# Patient Record
Sex: FEMALE | Race: BLACK OR AFRICAN AMERICAN | Employment: PART TIME | URBAN - METROPOLITAN AREA
[De-identification: names, ages, dates, MRNs, and addresses within clinical notes are randomized per-mention and may not be internally consistent; named-entity substitution may affect disease eponyms.]

---

## 2018-02-20 ENCOUNTER — OFFICE VISIT (OUTPATIENT)
Dept: OBGYN CLINIC | Age: 33
End: 2018-02-20

## 2018-02-20 VITALS — RESPIRATION RATE: 19 BRPM | SYSTOLIC BLOOD PRESSURE: 153 MMHG | HEART RATE: 89 BPM | DIASTOLIC BLOOD PRESSURE: 86 MMHG

## 2018-02-20 DIAGNOSIS — Z01.419 WELL FEMALE EXAM WITH ROUTINE GYNECOLOGICAL EXAM: Primary | ICD-10-CM

## 2018-02-20 NOTE — PROGRESS NOTES
Oliva Godwin is a ,  35 y.o. female [address] whose No LMP recorded. Patient is not currently having periods (Reason: IUD). was on  who presents for her annual checkup. She is having no significant problems. With regard to the Gardisil vaccine, she is older than the FDA approved age to receive it. Menstrual status:    Her periods are minimal to nonexistent in flow. She denies dysmenorrhea. She reports no premenstrual symptoms. Contraception:    The current method of family planning is IUD. Sexual history:    She  reports that she currently engages in sexual activity and has had male partners. She reports using the following method of birth control/protection: IUD. Medical conditions:    Since her last annual GYN exam about one year ago, she has not the following changes in her health history: none. Pap and Mammogram History:    Her most recent Pap smear was negative and HPV negative obtained 2.5 year(s) ago. The patient has never had a mammogram.    The patient does not have a family history of breast cancer. Past Medical History:   Diagnosis Date    Hx of abnormal cervical Pap smear     DENISE I    Hx of chlamydia infection 2009     History reviewed. No pertinent surgical history. Current Outpatient Prescriptions   Medication Sig Dispense Refill    levonorgestrel (MIRENA) 20 mcg/24 hr (5 years) IUD 1 Device by IntraUTERine route once. Allergies: Excedrin [acetaminophen-caffeine] and Ibuprofen   Social History     Social History    Marital status: SINGLE     Spouse name: N/A    Number of children: N/A    Years of education: N/A     Occupational History    Not on file.      Social History Main Topics    Smoking status: Never Smoker    Smokeless tobacco: Never Used    Alcohol use No    Drug use: No    Sexual activity: Yes     Partners: Male     Birth control/ protection: IUD     Other Topics Concern    Not on file     Social History Narrative     Tobacco History:  reports that she has never smoked. She has never used smokeless tobacco.  Alcohol Abuse:  reports that she does not drink alcohol. Drug Abuse:  reports that she does not use illicit drugs. There is no problem list on file for this patient.       Review of Systems - History obtained from the patient  Constitutional: negative for weight loss, fever, night sweats  HEENT: negative for hearing loss, earache, congestion, snoring, sorethroat  CV: negative for chest pain, palpitations, edema  Resp: negative for cough, shortness of breath, wheezing  GI: negative for change in bowel habits, abdominal pain, black or bloody stools  : negative for frequency, dysuria, hematuria, vaginal discharge  MSK: negative for back pain, joint pain, muscle pain  Breast: negative for breast lumps, nipple discharge, galactorrhea  Skin :negative for itching, rash, hives  Neuro: negative for dizziness, headache, confusion, weakness  Psych: negative for anxiety, depression, change in mood  Heme/lymph: negative for bleeding, bruising, pallor    Physical Exam    Visit Vitals    /86 (BP 1 Location: Left arm, BP Patient Position: Sitting)    Pulse 89    Resp 19       Constitutional  · Appearance: well-nourished, well developed, alert, in no acute distress    HENT  · Head and Face: appears normal    Neck  · Inspection/Palpation: normal appearance, no masses or tenderness  · Lymph Nodes: no lymphadenopathy present    Chest  · Respiratory Effort: breathing normal    Breasts  · Inspection of Breasts: breasts symmetrical, no skin changes, no discharge present, nipple appearance normal, no skin retraction present  · Palpation of Breasts and Axillae: no masses present on palpation, no breast tenderness  · Axillary Lymph Nodes: no lymphadenopathy present    Gastrointestinal  · Abdominal Examination: abdomen non-tender to palpation, normal bowel sounds, no masses present  · Liver and spleen: no hepatomegaly present, spleen not palpable  · Hernias: no hernias identified    Genitourinary  · External Genitalia: normal appearance for age, no discharge present, no tenderness present, no inflammatory lesions present, no masses present, no atrophy present  · Vagina: normal vaginal vault without central or paravaginal defects, no discharge present, no inflammatory lesions present, no masses present  · Bladder: non-tender to palpation  · Urethra: appears normal  · Cervix: normal, IUD string seen   · Uterus: normal size, shape and consistency  · Adnexa: no adnexal tenderness present, no adnexal masses present  · Perineum: perineum within normal limits, no evidence of trauma, no rashes or skin lesions present  · Anus: anus within normal limits, no hemorrhoids present  · Inguinal Lymph Nodes: no lymphadenopathy present    Skin  · General Inspection: no rash, no lesions identified    Neurologic/Psychiatric  · Mental Status:  · Orientation: grossly oriented to person, place and time  · Mood and Affect: mood normal, affect appropriate    . Assessment:  Routine gynecologic examination  Her current medical status is satisfactory with no evidence of significant gynecologic issues.     Plan:  Counseled re: diet, exercise, healthy lifestyle  Return for yearly wellness visits

## 2019-03-14 ENCOUNTER — OFFICE VISIT (OUTPATIENT)
Dept: OBGYN CLINIC | Age: 34
End: 2019-03-14

## 2019-03-14 VITALS
DIASTOLIC BLOOD PRESSURE: 101 MMHG | HEIGHT: 65 IN | SYSTOLIC BLOOD PRESSURE: 137 MMHG | HEART RATE: 92 BPM | RESPIRATION RATE: 19 BRPM | WEIGHT: 207 LBS | BODY MASS INDEX: 34.49 KG/M2

## 2019-03-14 DIAGNOSIS — Z01.419 WELL FEMALE EXAM WITH ROUTINE GYNECOLOGICAL EXAM: Primary | ICD-10-CM

## 2019-03-14 DIAGNOSIS — Z11.51 SPECIAL SCREENING EXAMINATION FOR HUMAN PAPILLOMAVIRUS (HPV): ICD-10-CM

## 2019-03-14 NOTE — LETTER
NOTIFICATION RETURN TO WORK / SCHOOL 
 
3/14/2019 8:54 AM 
 
Ms. Gregor Hyman [place] [id number] To Whom It May Concern: 
 
Gregor Hyman is currently under the care of [address] [place] [address]. She will return to work/school on: 03/14/19 If there are questions or concerns please have the patient contact our office.  
 
 
 
Sincerely, 
 
 
Isis Segura MD

## 2019-03-14 NOTE — PROGRESS NOTES
Gideon Harmno is a ,  29 y.o. female [address] whose No LMP recorded. Patient is not currently having periods (Reason: IUD). was on  who presents for her annual checkup. She is having no significant problems. With regard to the Gardisil vaccine, she is older than the FDA approved age to receive it. Menstrual status:    Her periods are minimal to nonexistent in flow. She denies dysmenorrhea. She reports no premenstrual symptoms. Contraception:    The current method of family planning is IUD. Sexual history:    She  reports that she currently engages in sexual activity and has had partners who are Male. She reports using the following method of birth control/protection: IUD. Medical conditions:    Since her last annual GYN exam about one year ago, she has not the following changes in her health history: none. Pap and Mammogram History:    Her most recent Pap smear was negative and HPV negative obtained 3.5 year(s) ago. The patient has never had a mammogram.    The patient does not have a family history of breast cancer. Past Medical History:   Diagnosis Date    Hx of abnormal cervical Pap smear     DENISE I    Hx of chlamydia infection 2009     History reviewed. No pertinent surgical history. Current Outpatient Medications   Medication Sig Dispense Refill    levonorgestrel (MIRENA) 20 mcg/24 hr (5 years) IUD 1 Device by IntraUTERine route once.        Allergies: Excedrin [acetaminophen-caffeine] and Ibuprofen   Social History     Socioeconomic History    Marital status: SINGLE     Spouse name: Not on file    Number of children: Not on file    Years of education: Not on file    Highest education level: Not on file   Social Needs    Financial resource strain: Not on file    Food insecurity - worry: Not on file    Food insecurity - inability: Not on file    Transportation needs - medical: Not on file    Transportation needs - non-medical: Not on file   Occupational History    Not on file   Tobacco Use    Smoking status: Never Smoker    Smokeless tobacco: Never Used   Substance and Sexual Activity    Alcohol use: No     Alcohol/week: 0.0 oz    Drug use: No    Sexual activity: Yes     Partners: Male     Birth control/protection: IUD   Other Topics Concern    Not on file   Social History Narrative    Not on file     Tobacco History:  reports that  has never smoked. she has never used smokeless tobacco.  Alcohol Abuse:  reports that she does not drink alcohol. Drug Abuse:  reports that she does not use drugs. There is no problem list on file for this patient.       Review of Systems - History obtained from the patient  Constitutional: negative for weight loss, fever, night sweats  HEENT: negative for hearing loss, earache, congestion, snoring, sorethroat  CV: negative for chest pain, palpitations, edema  Resp: negative for cough, shortness of breath, wheezing  GI: negative for change in bowel habits, abdominal pain, black or bloody stools  : negative for frequency, dysuria, hematuria, vaginal discharge  MSK: negative for back pain, joint pain, muscle pain  Breast: negative for breast lumps, nipple discharge, galactorrhea  Skin :negative for itching, rash, hives  Neuro: negative for dizziness, headache, confusion, weakness  Psych: negative for anxiety, depression, change in mood  Heme/lymph: negative for bleeding, bruising, pallor    Physical Exam    Visit Vitals  BP (!) 137/101 (BP 1 Location: Left arm, BP Patient Position: Sitting)   Pulse 92   Resp 19   Ht 5' 5\" (1.651 m)   Wt 207 lb (93.9 kg)   BMI 34.45 kg/m²       Constitutional  · Appearance: well-nourished, well developed, alert, in no acute distress    HENT  · Head and Face: appears normal    Neck  · Inspection/Palpation: normal appearance, no masses or tenderness  · Lymph Nodes: no lymphadenopathy present    Chest  · Respiratory Effort: breathing normal    Breasts  · Inspection of Breasts: breasts symmetrical, no skin changes, no discharge present, nipple appearance normal, no skin retraction present  · Palpation of Breasts and Axillae: no masses present on palpation, no breast tenderness  · Axillary Lymph Nodes: no lymphadenopathy present    Gastrointestinal  · Abdominal Examination: abdomen non-tender to palpation, normal bowel sounds, no masses present  · Liver and spleen: no hepatomegaly present, spleen not palpable  · Hernias: no hernias identified    Genitourinary  · External Genitalia: normal appearance for age, no discharge present, no tenderness present, no inflammatory lesions present, no masses present, no atrophy present  · Vagina: normal vaginal vault without central or paravaginal defects, no discharge present, no inflammatory lesions present, no masses present  · Bladder: non-tender to palpation  · Urethra: appears normal  · Cervix: normal, short IUD string seen   · Uterus: normal size, shape and consistency  · Adnexa: no adnexal tenderness present, no adnexal masses present  · Perineum: perineum within normal limits, no evidence of trauma, no rashes or skin lesions present  · Anus: anus within normal limits, no hemorrhoids present  · Inguinal Lymph Nodes: no lymphadenopathy present    Skin  · General Inspection: no rash, no lesions identified    Neurologic/Psychiatric  · Mental Status:  · Orientation: grossly oriented to person, place and time  · Mood and Affect: mood normal, affect appropriate    . Assessment:  Routine gynecologic examination  Her current medical status is satisfactory with no evidence of significant gynecologic issues. Plan:  Counseled re: diet, exercise, healthy lifestyle  Return for yearly wellness visits  Advised to see PCP re BP. Advised IUD expires in one year.

## 2019-03-16 LAB
CYTOLOGIST CVX/VAG CYTO: NORMAL
CYTOLOGY CVX/VAG DOC THIN PREP: NORMAL
CYTOLOGY HISTORY:: NORMAL
DX ICD CODE: NORMAL
HPV I/H RISK 1 DNA CVX QL PROBE+SIG AMP: NEGATIVE
Lab: NORMAL
OTHER STN SPEC: NORMAL
PATH REPORT.FINAL DX SPEC: NORMAL
STAT OF ADQ CVX/VAG CYTO-IMP: NORMAL

## 2020-10-18 NOTE — PROGRESS NOTES
Frank Grijalva is a ,  28 y.o. female [address] whose Patient's last menstrual period was 10/01/2020 (exact date). was on 10/1/2020 who presents for her annual checkup. She is having no significant problems. She wants IUD removed and wants a non hormonal birth control. With regard to the Gardisil vaccine, she is older than the FDA approved age to receive it. Menstrual status:    Her periods are light and monthly since 2019, with IUD (placed 16)    She denies dysmenorrhea. She reports no premenstrual symptoms. Contraception:    The current method of family planning is IUD and She declines contraception and counseling. Sexual history:    She  reports being sexually active and has had partner(s) who are Male. She reports using the following method of birth control/protection: I.U.D..    Medical conditions:    Since her last annual GYN exam about one year ago, she has not the following changes in her health history: diagnosed with hidradenitis suppurativa     Pap and Mammogram History:    Her most recent Pap smear was normal obtained 1 year(s) ago. The patient has never had a mammogram.    The patient does not have a family history of breast cancer. Past Medical History:   Diagnosis Date    Hx of abnormal cervical Pap smear     DENISE I    Hx of chlamydia infection 2009    Hydradenitis      History reviewed. No pertinent surgical history. Current Outpatient Medications   Medication Sig Dispense Refill    clindamycin-benzoyl Peroxide (DUAC) 1.2 %(1 % base) -5 % SR topical gel GLENIS TOPICALLY AA D      doxycycline (VIBRAMYCIN) 100 mg capsule TK 1 C PO D      levonorgestrel (MIRENA) 20 mcg/24 hr (5 years) IUD 1 Device by IntraUTERine route once.        Allergies: Excedrin [acetaminophen-caffeine] and Ibuprofen   Social History     Socioeconomic History    Marital status: SINGLE     Spouse name: Not on file    Number of children: Not on file    Years of education: Not on file    Highest education level: Not on file   Occupational History    Not on file   Social Needs    Financial resource strain: Not on file    Food insecurity     Worry: Not on file     Inability: Not on file    Transportation needs     Medical: Not on file     Non-medical: Not on file   Tobacco Use    Smoking status: Never Smoker    Smokeless tobacco: Never Used   Substance and Sexual Activity    Alcohol use: No     Alcohol/week: 0.0 standard drinks    Drug use: No    Sexual activity: Yes     Partners: Male     Birth control/protection: I.U.D. Lifestyle    Physical activity     Days per week: Not on file     Minutes per session: Not on file    Stress: Not on file   Relationships    Social connections     Talks on phone: Not on file     Gets together: Not on file     Attends [employer] service: Not on file     Active member of club or organization: Not on file     Attends meetings of clubs or organizations: Not on file     Relationship status: Not on file    Intimate partner violence     Fear of current or ex partner: Not on file     Emotionally abused: Not on file     Physically abused: Not on file     Forced sexual activity: Not on file   Other Topics Concern    Not on file   Social History Narrative    Not on file     Tobacco History:  reports that she has never smoked. She has never used smokeless tobacco.  Alcohol Abuse:  reports no history of alcohol use. Drug Abuse:  reports no history of drug use. There is no problem list on file for this patient.       Review of Systems - History obtained from the patient  Constitutional: negative for weight loss, fever, night sweats  HEENT: negative for hearing loss, earache, congestion, snoring, sorethroat  CV: negative for chest pain, palpitations, edema  Resp: negative for cough, shortness of breath, wheezing  GI: negative for change in bowel habits, abdominal pain, black or bloody stools  : negative for frequency, dysuria, hematuria, vaginal discharge  MSK: negative for back pain, joint pain, muscle pain  Breast: negative for breast lumps, nipple discharge, galactorrhea  Skin :negative for itching, rash, hives  Neuro: negative for dizziness, headache, confusion, weakness  Psych: negative for anxiety, depression, change in mood  Heme/lymph: negative for bleeding, bruising, pallor    Physical Exam    Visit Vitals  BP (!) 143/78   Ht 5' 5\" (1.651 m)   Wt 185 lb (83.9 kg)   LMP 10/01/2020 (Exact Date)   BMI 30.79 kg/m²       Constitutional  · Appearance: well-nourished, well developed, alert, in no acute distress    HENT  · Head and Face: appears normal    Neck  · Inspection/Palpation: normal appearance, no masses or tenderness  · Lymph Nodes: no lymphadenopathy present    Chest  · Respiratory Effort: breathing normal    Breasts  · Inspection of Breasts: breasts symmetrical, no skin changes, no discharge present, nipple appearance normal, no skin retraction present  · Palpation of Breasts and Axillae: no masses present on palpation, no breast tenderness  · Axillary Lymph Nodes: no lymphadenopathy present    Gastrointestinal  · Abdominal Examination: abdomen non-tender to palpation, normal bowel sounds, no masses present  · Liver and spleen: no hepatomegaly present, spleen not palpable  · Hernias: no hernias identified    Genitourinary  · External Genitalia: normal appearance for age, no discharge present, no tenderness present, no inflammatory lesions present, no masses present, no atrophy present  · Vagina: normal vaginal vault without central or paravaginal defects, no discharge present, no inflammatory lesions present, no masses present  · Bladder: non-tender to palpation  · Urethra: appears normal  · Cervix: normal, IUD string seen   · Uterus: normal size, shape and consistency  · Adnexa: no adnexal tenderness present, no adnexal masses present  · Perineum: perineum within normal limits, no evidence of trauma, no rashes or skin lesions present  · Anus: anus within normal limits, no hemorrhoids present  · Inguinal Lymph Nodes: no lymphadenopathy present    Skin  · General Inspection: no rash, no lesions identified    Neurologic/Psychiatric  · Mental Status:  · Orientation: grossly oriented to person, place and time  · Mood and Affect: mood normal, affect appropriate    . Assessment:  Routine gynecologic examination  Her current medical status is satisfactory with no evidence of significant gynecologic issues. Wants IUD removed    Plan:  Counseled re: diet, exercise, healthy lifestyle  Return for yearly wellness visits  Will remove IUD. She will use barrier BC. Advised of other non hormonal BC, ie: Paragard, BTL, barrier     IUD REMOVAL  Indications for Removal:  Cayden Steward is a ,  28 y.o. female [address] whose Patient's last menstrual period was 10/01/2020 (exact date). 10/1/2020. who presents today for IUD removal. Her current IUD was placed 5 years ago. . She has not had  problems with the IUD. She requests removal of the IUD because she wants non hormonal BC. The IUD removal procedure was discussed with the patient and she had no further questions. Procedure: The patient was placed in a dorsal lithotomy position and appropriately draped. On bimanual exam the uterus was anterior and normal in size with no tenderness present. A speculum exam was performed and the cervix was visualized. The cervix was prepped with zephiran solution. The IUD string was visualized. Using ring forceps , the string was grasped and the IUD removed intact. The IUD was shown to the patient.

## 2020-10-19 ENCOUNTER — OFFICE VISIT (OUTPATIENT)
Dept: OBGYN CLINIC | Age: 35
End: 2020-10-19
Payer: MEDICAID

## 2020-10-19 VITALS
BODY MASS INDEX: 30.82 KG/M2 | DIASTOLIC BLOOD PRESSURE: 78 MMHG | HEIGHT: 65 IN | WEIGHT: 185 LBS | SYSTOLIC BLOOD PRESSURE: 143 MMHG

## 2020-10-19 DIAGNOSIS — Z01.419 WELL WOMAN EXAM WITH ROUTINE GYNECOLOGICAL EXAM: ICD-10-CM

## 2020-10-19 DIAGNOSIS — Z30.432 ENCOUNTER FOR IUD REMOVAL: Primary | ICD-10-CM

## 2020-10-19 PROCEDURE — 58301 REMOVE INTRAUTERINE DEVICE: CPT | Performed by: OBSTETRICS & GYNECOLOGY

## 2020-10-19 PROCEDURE — S0612 ANNUAL GYNECOLOGICAL EXAMINA: HCPCS | Performed by: OBSTETRICS & GYNECOLOGY

## 2020-10-19 RX ORDER — CLINDAMYCIN PHOSPHATE AND BENZOYL PEROXIDE 10; 50 MG/G; MG/G
GEL TOPICAL
COMMUNITY
Start: 2020-07-13

## 2020-10-19 RX ORDER — DOXYCYCLINE 100 MG/1
CAPSULE ORAL
COMMUNITY
Start: 2020-07-13

## 2021-02-24 ENCOUNTER — OFFICE VISIT (OUTPATIENT)
Dept: OBGYN CLINIC | Age: 36
End: 2021-02-24
Payer: MEDICAID

## 2021-02-24 VITALS
HEIGHT: 65 IN | BODY MASS INDEX: 29.49 KG/M2 | WEIGHT: 177 LBS | SYSTOLIC BLOOD PRESSURE: 152 MMHG | DIASTOLIC BLOOD PRESSURE: 70 MMHG

## 2021-02-24 DIAGNOSIS — A59.01 TRICHOMONAS VAGINALIS (TV) INFECTION: ICD-10-CM

## 2021-02-24 DIAGNOSIS — N89.8 VAGINAL DISCHARGE: Primary | ICD-10-CM

## 2021-02-24 LAB — WET MOUNT POCT, WMPOCT: NORMAL

## 2021-02-24 PROCEDURE — 87210 SMEAR WET MOUNT SALINE/INK: CPT | Performed by: OBSTETRICS & GYNECOLOGY

## 2021-02-24 PROCEDURE — 99213 OFFICE O/P EST LOW 20 MIN: CPT | Performed by: OBSTETRICS & GYNECOLOGY

## 2021-02-24 RX ORDER — CHLORHEXIDINE GLUCONATE 4 %
LIQUID (ML) TOPICAL
COMMUNITY
Start: 2020-12-07

## 2021-02-24 RX ORDER — METRONIDAZOLE 500 MG/1
2000 TABLET ORAL ONCE
Qty: 4 TAB | Refills: 0 | Status: SHIPPED | OUTPATIENT
Start: 2021-02-24 | End: 2021-02-24

## 2021-02-24 NOTE — PROGRESS NOTES
Vaginitis evaluation    Chief Complaint   Vaginitis      HPI  39 y.o. female complains of colorless, odorless vaginal discharge for a couple of weeks . Patient's last menstrual period was 02/04/2021 (exact date). She denies additional symptoms at this time. The patient denies aggravating factors, but started taking Doxycycline BID in December 2020 for hidradenitis. She is not concerned about possible STI exposure at this time, but would like vaginal testing just be certain. She has had new exposure to Hibiclens, per her dermatologist.  Previous treatment included: none    Past Medical History:   Diagnosis Date    Hx of abnormal cervical Pap smear     DENISE I    Hx of chlamydia infection 07/2009    Hydradenitis      History reviewed. No pertinent surgical history. Social History     Occupational History    Not on file   Tobacco Use    Smoking status: Never Smoker    Smokeless tobacco: Never Used   Substance and Sexual Activity    Alcohol use: No     Alcohol/week: 0.0 standard drinks    Drug use: No    Sexual activity: Yes     Partners: Male     Birth control/protection: Condom     Family History   Problem Relation Age of Onset    Diabetes Maternal Grandmother         Allergies   Allergen Reactions    Excedrin [Acetaminophen-Caffeine] Swelling    Ibuprofen Rash and Itching     Prior to Admission medications    Medication Sig Start Date End Date Taking? Authorizing Provider   Hibiclens 4 % liquid APPLY TOPICALLY TO THE AFFECTED AREA THREE DAYS A WEEK 12/7/20  Yes Provider, Historical   clindamycin-benzoyl Peroxide (DUAC) 1.2 %(1 % base) -5 % SR topical gel GLENIS TOPICALLY AA D 7/13/20  Yes Provider, Historical   doxycycline (VIBRAMYCIN) 100 mg capsule TK 1 C PO D 7/13/20  Yes Provider, Historical   levonorgestrel (MIRENA) 20 mcg/24 hr (5 years) IUD 1 Device by IntraUTERine route once.   2/24/21  Provider, Historical                      Review of Systems - History obtained from the patient  Constitutional: negative for weight loss, fever, night sweats  Breast: negative for breast lumps, nipple discharge, galactorrhea  GI: negative for change in bowel habits, abdominal pain, black or bloody stools  : negative for frequency, dysuria, hematuria  MSK: negative for back pain, joint pain, muscle pain  Skin: negative for itching, rash, hives  Neuro: negative for dizziness, headache, confusion, weakness  Psych: negative for anxiety, depression, change in mood  Heme/lymph: negative for bleeding, bruising, pallor       Objective:    Visit Vitals  BP (!) 152/70   Ht 5' 5\" (1.651 m)   Wt 177 lb (80.3 kg)   LMP 02/04/2021 (Exact Date)   BMI 29.45 kg/m²       Physical Exam:   PHYSICAL EXAMINATION    Constitutional  · Appearance: well-nourished, well developed, alert, in no acute distress    HENT  · Head and Face: appears normal    Genitourinary  · External Genitalia: normal appearance for age, no discharge present, no tenderness present, no inflammatory lesions present, no masses present, no atrophy present  · Vagina:  coupious yellow discharge present, otherwise normal vaginal vault without central or paravaginal defects, no inflammatory lesions present, no masses present  · Bladder: non-tender to palpation  · Urethra: appears normal  · Cervix: normal   · Uterus:   · Adnexa  · Perineum: perineum within normal limits, no evidence of trauma, no rashes or skin lesions present  · Anus: anus within normal limits, no hemorrhoids present  · Inguinal Lymph Nodes: no lymphadenopathy present    Skin  · General Inspection: no rash, no lesions identified    Neurologic/Psychiatric  · Mental Status:  · Orientation: grossly oriented to person, place and time  · Mood and Affect: mood normal, affect appropriate      Microscopic wet-mount exam shows trichomonads. .        Assessment:   trichomonas    Plan:   Treatment: abstain from coitus during course of treatment and Flagyl 2 grams x1  Given handout about [facility]  Advised for partner to be treated    ROV prn if symptoms persist or worsen.

## 2021-02-27 LAB
A VAGINAE DNA VAG QL NAA+PROBE: ABNORMAL SCORE
BVAB2 DNA VAG QL NAA+PROBE: ABNORMAL SCORE
C ALBICANS DNA VAG QL NAA+PROBE: NEGATIVE
C GLABRATA DNA VAG QL NAA+PROBE: NEGATIVE
C TRACH DNA VAG QL NAA+PROBE: NEGATIVE
MEGA1 DNA VAG QL NAA+PROBE: ABNORMAL SCORE
N GONORRHOEA DNA VAG QL NAA+PROBE: NEGATIVE
T VAGINALIS DNA VAG QL NAA+PROBE: POSITIVE

## 2021-04-02 ENCOUNTER — OFFICE VISIT (OUTPATIENT)
Dept: OBGYN CLINIC | Age: 36
End: 2021-04-02
Payer: MEDICAID

## 2021-04-02 VITALS
BODY MASS INDEX: 28.99 KG/M2 | SYSTOLIC BLOOD PRESSURE: 134 MMHG | HEIGHT: 65 IN | WEIGHT: 174 LBS | DIASTOLIC BLOOD PRESSURE: 78 MMHG

## 2021-04-02 DIAGNOSIS — Z30.430 ENCOUNTER FOR IUD INSERTION: Primary | ICD-10-CM

## 2021-04-02 LAB
HCG URINE, QL. (POC): NEGATIVE
VALID INTERNAL CONTROL?: YES

## 2021-04-02 PROCEDURE — 81025 URINE PREGNANCY TEST: CPT | Performed by: OBSTETRICS & GYNECOLOGY

## 2021-04-02 PROCEDURE — 58300 INSERT INTRAUTERINE DEVICE: CPT | Performed by: OBSTETRICS & GYNECOLOGY

## 2021-04-02 RX ORDER — LEVONORGESTREL 52 MG/1
1 INTRAUTERINE DEVICE INTRAUTERINE ONCE
Qty: 1 DEVICE | Refills: 0
Start: 2021-04-02 | End: 2021-04-02

## 2021-04-02 NOTE — PROGRESS NOTES
[facility]  OFFICE PROCEDURE PROGRESS NOTE        Chart reviewed for the following:   Michael Wong LPN, have reviewed the History, Physical and updated the Allergic reactions for [facility] performed immediately prior to start of procedure:   Анна AMAYA LPN, have performed the following reviews on Radha Gutierrez prior to the start of the procedure:            * Patient was identified by name and date of birth   * Agreement on procedure being performed was verified  * Risks and Benefits explained to the patient  * Consent was signed and verified     Time: 3121    Date of procedure: 4/2/2021    Procedure performed by: Paxton Mtz MD    Provider assisted by: Titi Schwarz. Yashira ORTIZ    Patient assisted by: self    How tolerated by patient: tolerated the procedure well with no complications    Post Procedural Pain Scale: 2 - Hurts Little Bit    Comments: none      INSERTION----------------------------------------- Indications:  Radha Gutierrez is a G4 ,  39 y.o. female BLACK/ whose Patient's last menstrual period was 03/30/2021. was on 3/30/2021 and was normal in duration and amount of flow. who presents for insertion of an IUD. The risks, benefits and alternatives of IUD insertion were discussed in detail at last visit. She also has reviewed Mirena information. She has elected to proceed with the insertion today and she states she has no further questions. A urine pregnancy test was negative No components found for: SPEP, UPEP    Procedure: The pelvic exam revealed normal external genitalia. On bimanual exam the uterus was anteverted and normal in size with no tenderness present. A speculum was inserted into the vagina and the cervix was visualized. The cervix was prepped with zephiran solution. The anterior lip of the cervix was grasped with a single toothed tenaculum. The uterus was sounded with a Kat sound to 10 centimeters.  A Mirena was then inserted without difficulty. The string was cut to 3 centimeters. She experienced a mild  amount of cramping. Post Procedure Status: She tolerated the procedure with mild discomfort. The patient was observed for 10 minutes after the insertion. There were no complications. Patient was discharged in stable condition. The patient received Mirena lot number [id number].

## 2021-05-13 NOTE — PROGRESS NOTES
This is a follow-up visit for Gerald Syed is a G4 ,  39 y.o. female BLACK/ whose No LMP recorded. (Menstrual status: IUD). was on . She had an Mirena IUD placed six weeks ago. Since the IUD placement, the patient has had IUD complaints. She bled for a month and a half after Mirena was placed, She does have a vaginal discharge. She was treated for Trich a few months and states that her partner was tested and was negative. He was not treated. Her symptoms got better and then recurred. She has not had significant generalized pain. She has had no fever. Associated signs and symptoms: she denies dyspareunia, expulsion, heavy bleeding, increased pain, fever, and pelvic pain. Ultrasound was done today and revealed appropriate placement of the IUD in the endometrial cavity. TRANSVAGINAL ULTRASOUND PERFORMED  UTERUS IS ANTEVERTED, NORMAL IN SIZE AND INHOMOGENOUS IN ECHOGENICITY. THE UTERUS APPEARS BULKY AND HAS THE APPEARANCE OF ADENOMYOSIS. ENDOMETRIUM MEASURES 5-6MM IN THICKNESS. NO EVIDENCE OF MASSES OR ABNORMALITIES ARE SEEN. IUD IS SEEN IN THE PROPER POSITION WITHIN THE ENDOMETRIAL CAVITY IN THE UTERINE FUNDUS. RIGHT ADNEXA APPEARS WITHIN NORMAL LIMITS. LEFT OVARY APPEARS WITHIN NORMAL LIMITS. NO FREE FLUID SEEN IN THE CDS. Past Medical History:   Diagnosis Date    Hx of abnormal cervical Pap smear     DNEISE I    Hx of chlamydia infection 07/2009    Hydradenitis      No past surgical history on file.   Social History     Occupational History    Not on file   Tobacco Use    Smoking status: Never Smoker    Smokeless tobacco: Never Used   Substance and Sexual Activity    Alcohol use: No     Alcohol/week: 0.0 standard drinks    Drug use: No    Sexual activity: Yes     Partners: Male     Birth control/protection: Condom     Family History   Problem Relation Age of Onset    Diabetes Maternal Grandmother        Allergies   Allergen Reactions    Excedrin [Acetaminophen-Caffeine] Swelling    Ibuprofen Rash and Itching     Prior to Admission medications    Medication Sig Start Date End Date Taking? Authorizing Provider   metroNIDAZOLE (FLAGYL) 500 mg tablet Take 1 Tab by mouth two (2) times a day for 7 days.  5/14/21 5/21/21 Yes Irma Johnston MD   Hibiclens 4 % liquid APPLY TOPICALLY TO THE AFFECTED AREA THREE DAYS A WEEK 12/7/20   Provider, Historical   clindamycin-benzoyl Peroxide (DUAC) 1.2 %(1 % base) -5 % SR topical gel GLENIS TOPICALLY AA D 7/13/20   Provider, Historical   doxycycline (VIBRAMYCIN) 100 mg capsule TK 1 C PO D 7/13/20   Provider, Historical        Review of Systems: History obtained from the patient  Constitutional: negative for weight loss, fever, night sweats  Breast: negative for breast lumps, nipple discharge, galactorrhea  GI: negative for change in bowel habits, abdominal pain, black or bloody stools  : negative for frequency, dysuria, hematuria, vaginal discharge  MSK: negative for back pain, joint pain, muscle pain  Skin: negative for itching, rash, hives  Psych: negative for anxiety, depression, change in mood      Objective:  Visit Vitals  /80   Ht 5' 5\" (1.651 m)   Wt 171 lb (77.6 kg)   BMI 28.46 kg/m²       Physical Exam:   PHYSICAL EXAMINATION    Constitutional  · Appearance: well-nourished, well developed, alert, in no acute distress    Gastrointestinal  · Abdominal Examination: abdomen non-tender to palpation, normal bowel sounds, no masses present  · Liver and spleen: no hepatomegaly present, spleen not palpable  · Hernias: no hernias identified    Genitourinary  · External Genitalia: normal appearance for age, no discharge present, no tenderness present, no inflammatory lesions present, no masses present, no atrophy present  · Vagina: normal vaginal vault without central or paravaginal defects, copious yellow discharge present, no inflammatory lesions present, no masses present  · Bladder: non-tender to palpation  · Urethra: appears normal  · Cervix: normal with IUD string visible and appropriate length   · Uterus: normal size, shape and consistency  · Adnexa: no adnexal tenderness present, no adnexal masses present  · Perineum: perineum within normal limits, no evidence of trauma, no rashes or skin lesions present    Skin  · General Inspection: no rash, no lesions identified    Neurologic/Psychiatric  · Mental Status:  · Orientation: grossly oriented to person, place and time  · Mood and Affect: mood normal, affect appropriate    Wet prep and KOH shows Trich    Assessment:   IUD in the proper position  Recurrent Trich    Plan:     Nuswab sent  FLagyl 500 bid for one week.  Advised that partner should be treated as well

## 2021-05-14 ENCOUNTER — OFFICE VISIT (OUTPATIENT)
Dept: OBGYN CLINIC | Age: 36
End: 2021-05-14
Payer: MEDICAID

## 2021-05-14 VITALS
HEIGHT: 65 IN | WEIGHT: 171 LBS | DIASTOLIC BLOOD PRESSURE: 80 MMHG | BODY MASS INDEX: 28.49 KG/M2 | SYSTOLIC BLOOD PRESSURE: 130 MMHG

## 2021-05-14 DIAGNOSIS — N76.1 CHRONIC VAGINITIS: ICD-10-CM

## 2021-05-14 DIAGNOSIS — A59.01 TRICHOMONAS VAGINITIS: Primary | ICD-10-CM

## 2021-05-14 LAB — WET MOUNT POCT, WMPOCT: NORMAL

## 2021-05-14 PROCEDURE — 87210 SMEAR WET MOUNT SALINE/INK: CPT | Performed by: OBSTETRICS & GYNECOLOGY

## 2021-05-14 PROCEDURE — 99214 OFFICE O/P EST MOD 30 MIN: CPT | Performed by: OBSTETRICS & GYNECOLOGY

## 2021-05-14 RX ORDER — METRONIDAZOLE 500 MG/1
500 TABLET ORAL 2 TIMES DAILY
Qty: 14 TAB | Refills: 1 | Status: SHIPPED | OUTPATIENT
Start: 2021-05-14 | End: 2021-05-21

## 2023-05-12 RX ORDER — CLINDAMYCIN PHOSPHATE AND BENZOYL PEROXIDE 10; 50 MG/G; MG/G
GEL TOPICAL
COMMUNITY
Start: 2020-07-13

## 2023-05-12 RX ORDER — DOXYCYCLINE HYCLATE 100 MG/1
CAPSULE ORAL
COMMUNITY
Start: 2020-07-13

## 2023-05-12 RX ORDER — CHLORHEXIDINE GLUCONATE 213 G/1000ML
SOLUTION TOPICAL
COMMUNITY
Start: 2020-12-07